# Patient Record
Sex: FEMALE | Race: WHITE | NOT HISPANIC OR LATINO
[De-identification: names, ages, dates, MRNs, and addresses within clinical notes are randomized per-mention and may not be internally consistent; named-entity substitution may affect disease eponyms.]

---

## 2021-04-07 PROBLEM — Z00.00 ENCOUNTER FOR PREVENTIVE HEALTH EXAMINATION: Status: ACTIVE | Noted: 2021-04-07

## 2021-04-15 ENCOUNTER — NON-APPOINTMENT (OUTPATIENT)
Age: 81
End: 2021-04-15

## 2021-04-15 ENCOUNTER — APPOINTMENT (OUTPATIENT)
Dept: PHYSICAL MEDICINE AND REHAB | Facility: CLINIC | Age: 81
End: 2021-04-15
Payer: MEDICARE

## 2021-04-15 VITALS
RESPIRATION RATE: 15 BRPM | HEART RATE: 96 BPM | DIASTOLIC BLOOD PRESSURE: 86 MMHG | SYSTOLIC BLOOD PRESSURE: 118 MMHG | BODY MASS INDEX: 30.55 KG/M2 | HEIGHT: 62 IN | WEIGHT: 166 LBS

## 2021-04-15 VITALS
HEIGHT: 62 IN | WEIGHT: 166 LBS | SYSTOLIC BLOOD PRESSURE: 118 MMHG | DIASTOLIC BLOOD PRESSURE: 80 MMHG | BODY MASS INDEX: 30.55 KG/M2

## 2021-04-15 DIAGNOSIS — M25.552 PAIN IN RIGHT HIP: ICD-10-CM

## 2021-04-15 DIAGNOSIS — M25.561 PAIN IN RIGHT HIP: ICD-10-CM

## 2021-04-15 DIAGNOSIS — M25.512 PAIN IN RIGHT SHOULDER: ICD-10-CM

## 2021-04-15 DIAGNOSIS — Z86.39 PERSONAL HISTORY OF OTHER ENDOCRINE, NUTRITIONAL AND METABOLIC DISEASE: ICD-10-CM

## 2021-04-15 DIAGNOSIS — R26.89 OTHER ABNORMALITIES OF GAIT AND MOBILITY: ICD-10-CM

## 2021-04-15 DIAGNOSIS — M25.551 PAIN IN RIGHT HIP: ICD-10-CM

## 2021-04-15 DIAGNOSIS — M47.819 SPONDYLOSIS W/OUT MYELOPATHY OR RADICULOPATHY, SITE UNSPECIFIED: ICD-10-CM

## 2021-04-15 DIAGNOSIS — M75.51 BURSITIS OF RIGHT SHOULDER: ICD-10-CM

## 2021-04-15 DIAGNOSIS — M25.511 PAIN IN RIGHT SHOULDER: ICD-10-CM

## 2021-04-15 DIAGNOSIS — G89.29 PAIN IN RIGHT HIP: ICD-10-CM

## 2021-04-15 DIAGNOSIS — Z87.39 PERSONAL HISTORY OF OTHER DISEASES OF THE MUSCULOSKELETAL SYSTEM AND CONNECTIVE TISSUE: ICD-10-CM

## 2021-04-15 DIAGNOSIS — Z86.79 PERSONAL HISTORY OF OTHER DISEASES OF THE CIRCULATORY SYSTEM: ICD-10-CM

## 2021-04-15 DIAGNOSIS — M40.00 POSTURAL KYPHOSIS, SITE UNSPECIFIED: ICD-10-CM

## 2021-04-15 DIAGNOSIS — R54 AGE-RELATED PHYSICAL DEBILITY: ICD-10-CM

## 2021-04-15 DIAGNOSIS — M25.562 PAIN IN RIGHT HIP: ICD-10-CM

## 2021-04-15 DIAGNOSIS — M54.9 DORSALGIA, UNSPECIFIED: ICD-10-CM

## 2021-04-15 DIAGNOSIS — M25.69 STIFFNESS OF OTHER SPECIFIED JOINT, NOT ELSEWHERE CLASSIFIED: ICD-10-CM

## 2021-04-15 PROCEDURE — 99204 OFFICE O/P NEW MOD 45 MIN: CPT

## 2021-04-15 RX ORDER — HYDRALAZINE HYDROCHLORIDE 50 MG/1
TABLET ORAL
Refills: 0 | Status: ACTIVE | COMMUNITY

## 2021-04-15 RX ORDER — DICLOFENAC SODIUM 1 %
KIT TOPICAL
Refills: 0 | Status: ACTIVE | COMMUNITY

## 2021-04-15 RX ORDER — LEVOTHYROXINE SODIUM 137 UG/1
TABLET ORAL
Refills: 0 | Status: ACTIVE | COMMUNITY

## 2021-04-15 RX ORDER — LOSARTAN POTASSIUM 100 MG/1
TABLET, FILM COATED ORAL
Refills: 0 | Status: ACTIVE | COMMUNITY

## 2021-04-15 NOTE — PHYSICAL EXAM
[FreeTextEntry1] : PHYSICAL EXAM : OBJECTIVE \par \par GENERAL : Awake ,alert and oriented to time place and person \par HEAD : normocephalic and atraumatic \par NECK : supple ,no tracheal deviation ,no thyroid enlargement noted with swallowing\par EYES : sclera and conjunctiva normal no redness,intact extraocular movements \par ENT  : ears and nose normal in appearance -hearing adequate \par PULMONARY: effort normal. No respiratory distress. breathing regular. No wheezes \par LYMPH : No swelling in limbs, capillary return within normal range \par CVS : warm extremities,no palpitations,not short of breath, no visible jugular venous distention\par PSYCH : mood and affect normal ,good eye contact ,normal attention \par ABDOMEN : no visible distension , \par NEUROLOGICAL:cranial nerves intact,muscle tone normal,gait and balance safe except where noted below \par SKIN : warm and dry No rash detected over specific body areas examined \par MUSCULOSKELETAL: normal muscle bulk, no focal bony tenderness /posture normal except where specified below\par frail bent kyphotic pain with forward flexion spine \par pain mid lower back \par needs help getting off exam table \par needs help to walk hand held or cane \par stiff back pain with ROM leslie extension \par No long tract signs found on clinical exam and no focal neurological deficits\par hands arthritic \par well perfused hands \par R shoulder tight painful ROM \par neg drop test \par SLR neg \par knees arthritic fredy \par hips ok for age

## 2021-04-15 NOTE — HISTORY OF PRESENT ILLNESS
[FreeTextEntry1] : Ms. LEI FENTON is a very pleasant 80 year female who seen for evaluation of low back pain that has been ongoing for 6 weeks  but has been a chronic problem without any specific injury or inciting event. The pain is located primarily lower back intermittent in nature and described as achy and intermittently sharp . The pain is rated as 3/10 constant. The patient's symptoms are aggravated by cold weather rain, walking , activities, and twisting  and alleviated by heat and rest. The patient denies any radiating symptoms to the lower extremities, numbness/tingling, weakness, or bowel/bladder dysfunction. The patient has  other complaints at this time of bilateral shoulder pains/hands arthritis/knee pains \par \par xrays some time  ago -told she has poly arthritis \par saw orthopedist who gave her a script for PT \par it was a general script with many diagnosis \par she is here to assess her for PT with focused prescription of her most acute problem .\par using Voltaren gel with some relief of knee pain\par \par her granddaughter Gillian comes with her as she only speaks Nepali -no English \par \par

## 2021-04-15 NOTE — REVIEW OF SYSTEMS
[Patient Intake Form Reviewed] : Patient intake form was reviewed [Joint Pain] : joint pain [Joint Stiffness] : joint stiffness [Muscle Pain] : muscle pain [Muscle Weakness] : muscle weakness [Difficulty Walking] : difficulty walking [Negative] : Heme/Lymph [Fever] : no fever [Chills] : no chills [Lower Ext Edema] : no lower extremity edema [Skin Wound] : no skin wound

## 2021-04-15 NOTE — ASSESSMENT
[FreeTextEntry1] : \par PLAN AND RECOMMENDATIONS :\par \par We discussed differential diagnosis and clinical impression\par Generalized polyarthritic pain\par chronic spinal arthropathy with antalgia and stiffness leslie getting up \par frail gait -risk of falls \par \par \par Recommend\par .symptomatic care and support\par  medications cont voltaren gel \par  imaging -not needed to start PT \par  referral to PT \par  hydrotherapy /heat / cold for pain\par  continue  spinal precautions including care with bending, lifting, twisting and  carrying.\par fall precautions use cane \par  relative rest and avoidance of painful activity where possible \par  increasing activity as discussed \par  return for follow up  4 weeks \par \par Time spent for patient care included  :\par \par coordination of a treatment plan with patient agreeable to such and understanding why -all questions answered for her and GD \par \par advice on medications and reiteration of common side effects  GI upset with nsaid she is on- even gel can cause -explained \par planned future interventions if condition fails to improve or resolve pain Rx will get MRI \par \par discussion of prognosis and reassurance- should do better with modalities and gait training needs weight loss and  reconditioning quite weak\par patient/family  education- fall prevention \par \par other non pharmacologic stress and pain reducers such as sunlight-fresh air/ sleep hygiene/ stress reduction \par red warning flags if certain symptoms/signs arise  eg foot drop leg pain \par realistic goals given age and other medical comorbidities she is quite old and fragile \par \par .\par \par  I certify that the time spent in total,for encounter was 45 minutes \par much time needed to communicate in another language and needed more time -given her age  80 and multiple conditions with many sites of pain as well as fragility  -\par \par \par \par \par